# Patient Record
Sex: FEMALE | Race: WHITE | Employment: UNEMPLOYED | URBAN - METROPOLITAN AREA
[De-identification: names, ages, dates, MRNs, and addresses within clinical notes are randomized per-mention and may not be internally consistent; named-entity substitution may affect disease eponyms.]

---

## 2023-08-30 ENCOUNTER — APPOINTMENT (OUTPATIENT)
Dept: GENERAL RADIOLOGY | Age: 38
End: 2023-08-30

## 2023-08-30 ENCOUNTER — HOSPITAL ENCOUNTER (EMERGENCY)
Age: 38
Discharge: HOME | End: 2023-08-30
Attending: EMERGENCY MEDICINE

## 2023-08-30 VITALS
OXYGEN SATURATION: 99 % | HEART RATE: 80 BPM | DIASTOLIC BLOOD PRESSURE: 81 MMHG | WEIGHT: 109 LBS | SYSTOLIC BLOOD PRESSURE: 129 MMHG | BODY MASS INDEX: 18.16 KG/M2 | RESPIRATION RATE: 15 BRPM | HEIGHT: 65 IN

## 2023-08-30 DIAGNOSIS — S81.001A OPEN WOUND OF RIGHT KNEE, LEG, AND ANKLE, INITIAL ENCOUNTER: Primary | ICD-10-CM

## 2023-08-30 DIAGNOSIS — S81.801A OPEN WOUND OF RIGHT KNEE, LEG, AND ANKLE, INITIAL ENCOUNTER: Primary | ICD-10-CM

## 2023-08-30 DIAGNOSIS — S91.001A OPEN WOUND OF RIGHT KNEE, LEG, AND ANKLE, INITIAL ENCOUNTER: Primary | ICD-10-CM

## 2023-08-30 PROCEDURE — 73590 X-RAY EXAM OF LOWER LEG: CPT

## 2023-08-30 PROCEDURE — 99283 EMERGENCY DEPT VISIT LOW MDM: CPT

## 2023-08-30 RX ORDER — NORETHINDRONE ACETATE AND ETHINYL ESTRADIOL 1MG-20(21)
1 KIT ORAL DAILY
COMMUNITY

## 2023-08-30 RX ORDER — ARIPIPRAZOLE 5 MG/1
5 TABLET ORAL DAILY
COMMUNITY

## 2023-08-30 RX ORDER — BUPROPION HYDROCHLORIDE 300 MG/1
300 TABLET ORAL EVERY MORNING
COMMUNITY

## 2023-08-30 NOTE — ED PROVIDER NOTES
ED Attending Attestation Note     Date of evaluation: 8/30/2023    This patient was seen by the advance practice provider. I have seen and examined the patient, agree with the workup, evaluation, management and diagnosis. The care plan has been discussed. Briefly, Tyler Corrigan is a 40 y.o. female with a PMH inclusive of IVDU who presents for evaluation of leg wound. States it has been present over the last few weeks. She has completed multiple courses of antibiotics including Bactrim, Keflex, clindamycin and recently started taking doxycycline that she had at home. Presented today because she states it drained some purulent fluid last night and because it has not healed. .    Of note, patient is currently in inpatient treatment and has been sober for the last 20 days. Notable exam findings include superficial wound noted to right shin. No surrounding erythema. No associated edema, induration, fluctuance. I am unable to express any fluid including any purulence. Assessment/ Medical Decision Making:     Patient with superficial wound on exam without evidence of secondary infection on my assessment. X-ray obtained given history of intravenous drug use and slow healing however I am not concerned for necrotizing fasciitis or acute surgical pathology based on exam.  Do not feel that she needs systemic antibiotics any longer based on appearance as it looks like a superficial slowly healing wound. Given its small size and superficiality also do not feel that she needs wound care at this point. Counseled to continue to monitor symptoms and treat supportively. She will follow-up as an outpatient. Given that she is currently inpatient she denies any additional needs as far as her recovery process.          Marisol Becker MD  08/30/23 9718
diaphoretic. HENT:      Head: Normocephalic and atraumatic. Nose: Nose normal.      Mouth/Throat:      Mouth: Mucous membranes are moist.      Pharynx: Oropharynx is clear. Eyes:      Extraocular Movements: Extraocular movements intact. Cardiovascular:      Rate and Rhythm: Normal rate. Pulses: Normal pulses. Pulmonary:      Effort: Pulmonary effort is normal. No respiratory distress. Abdominal:      General: Abdomen is flat. Palpations: Abdomen is soft. Musculoskeletal:         General: Normal range of motion. Cervical back: Normal range of motion. Comments: Superficial wound with no obvious head, palpable fluctuance, induration or visible purulence on the right anterior shin. No surrounding erythema. No palpable crepitus. Skin:     General: Skin is warm and dry. Neurological:      General: No focal deficit present. Mental Status: She is alert and oriented to person, place, and time. Psychiatric:         Mood and Affect: Mood normal.         Behavior: Behavior normal.       Diagnostic Results     RADIOLOGY:  XR TIBIA FIBULA RIGHT (2 VIEWS)   Final Result      Normal radiographic examination of the right tibia and fibula. LABS:   No results found for this visit on 08/30/23. RECENT VITALS:  BP: 129/81,  , Pulse: 80, Respirations: 15, SpO2: 99 %     Procedures     N/a    ED Course     Nursing Notes, Past Medical Hx,Past Surgical Hx, Social Hx, Allergies, and Family Hx were reviewed. The patient was given the following medications:  Orders Placed This Encounter   Medications    mupirocin (BACTROBAN) 2 % ointment     Sig: Apply topically 3 times daily.      Dispense:  3 g     Refill:  0       CONSULTS:  None    MEDICAL DECISION MAKING / ASSESSMENT / PLAN     Vitals:    08/30/23 1151   BP: 129/81   Pulse: 80   Resp: 15   TempSrc: Oral   SpO2: 99%   Weight: 109 lb (49.4 kg)   Height: 5' 5\" (1.651 m)       Dorota Odell is a 40 y.o. female who presents to the

## 2023-08-30 NOTE — ED TRIAGE NOTES
Patient presents to ED with reports of wound to her Right lower extremity, patient reports that this wound had been persistent since 8/1/2023 with some improvement noted but patient was concerned as there was puss draining from the wound yesterday.

## 2023-08-30 NOTE — DISCHARGE INSTRUCTIONS
ED Course     Today, you were seen in the Emergency Department. You have been diagnosed with:   1. Open wound of right knee, leg, and ankle, initial encounter      Disposition/Plan     IMPORTANT INSTRUCTIONS:  Your wound is well-appearing and does not appear to be systemically infected. You can use the prescribed ointment to help with the wound. Your x-ray did not show any evidence of infection down to your bone. Do your best not to pick at the area. Follow-up with your primary care physician as soon as possible regarding your visit. Be sure to return to the Emergency Department immediately if symptoms worsen or new symptoms occur as we discussed, such as increased redness, increased swelling, increased pain. PLEASE FOLLOW UP WITH:  [facility] AT [address]    Schedule an appointment as soon as possible for a visit   To establish care with a primary care provider    DISCHARGE MEDICATIONS:  The following medications were prescribed for the you during this visit. Take them as directed below:     New Prescriptions    MUPIROCIN (BACTROBAN) 2 % OINTMENT    Apply topically 3 times daily. Additional important information has been included with this packet, please make sure that you have read this information as it will better help you understand you illness/injury better.